# Patient Record
Sex: FEMALE | Race: WHITE | NOT HISPANIC OR LATINO | URBAN - METROPOLITAN AREA
[De-identification: names, ages, dates, MRNs, and addresses within clinical notes are randomized per-mention and may not be internally consistent; named-entity substitution may affect disease eponyms.]

---

## 2018-08-27 ENCOUNTER — APPOINTMENT (OUTPATIENT)
Dept: WOMENS IMAGING | Facility: HOSPITAL | Age: 53
End: 2018-08-27

## 2018-08-27 PROCEDURE — 77067 SCR MAMMO BI INCL CAD: CPT | Performed by: RADIOLOGY

## 2018-08-27 PROCEDURE — 77063 BREAST TOMOSYNTHESIS BI: CPT | Performed by: RADIOLOGY

## 2019-03-14 ENCOUNTER — OFFICE VISIT (OUTPATIENT)
Dept: CARDIOLOGY | Facility: CLINIC | Age: 54
End: 2019-03-14

## 2019-03-14 VITALS
BODY MASS INDEX: 36.88 KG/M2 | HEART RATE: 61 BPM | SYSTOLIC BLOOD PRESSURE: 136 MMHG | DIASTOLIC BLOOD PRESSURE: 88 MMHG | HEIGHT: 64 IN | WEIGHT: 216 LBS

## 2019-03-14 DIAGNOSIS — R07.89 CHEST PRESSURE: Primary | ICD-10-CM

## 2019-03-14 DIAGNOSIS — R06.02 SHORTNESS OF BREATH: ICD-10-CM

## 2019-03-14 DIAGNOSIS — I10 ESSENTIAL HYPERTENSION: ICD-10-CM

## 2019-03-14 DIAGNOSIS — E78.00 HYPERCHOLESTEREMIA: ICD-10-CM

## 2019-03-14 DIAGNOSIS — E88.81 METABOLIC SYNDROME: ICD-10-CM

## 2019-03-14 PROBLEM — E88.810 METABOLIC SYNDROME: Status: ACTIVE | Noted: 2019-03-14

## 2019-03-14 PROCEDURE — 93000 ELECTROCARDIOGRAM COMPLETE: CPT | Performed by: INTERNAL MEDICINE

## 2019-03-14 PROCEDURE — 99244 OFF/OP CNSLTJ NEW/EST MOD 40: CPT | Performed by: INTERNAL MEDICINE

## 2019-03-14 RX ORDER — ESTRADIOL 0.5 MG/1
0.5 TABLET ORAL DAILY
COMMUNITY

## 2019-03-14 RX ORDER — HYDROCODONE BITARTRATE AND ACETAMINOPHEN 5; 325 MG/1; MG/1
1 TABLET ORAL DAILY
COMMUNITY

## 2019-03-14 RX ORDER — NITROGLYCERIN 0.4 MG/1
TABLET SUBLINGUAL
Qty: 100 TABLET | Refills: 11 | Status: SHIPPED | OUTPATIENT
Start: 2019-03-14 | End: 2020-09-18

## 2019-03-14 RX ORDER — LISINOPRIL 40 MG/1
40 TABLET ORAL DAILY
COMMUNITY
End: 2019-09-17 | Stop reason: DRUGHIGH

## 2019-03-14 RX ORDER — ATORVASTATIN CALCIUM 40 MG/1
40 TABLET, FILM COATED ORAL DAILY
COMMUNITY

## 2019-03-14 RX ORDER — LEVOTHYROXINE SODIUM 88 UG/1
88 TABLET ORAL DAILY
COMMUNITY

## 2019-03-14 NOTE — PROGRESS NOTES
Chief Complaint   Patient presents with   • Establish Care     Referred for chest tightness. Reports that chest tightness started a few months ago, states that she has been having trouble catching her breath and has the tightness. Reports that she does have palpitations when she has the she has the tightness and SOA with a bad headache, with some jaw pain. Went to [facility] a few months ago and did stress test, will attempt to obtain. Will get labs from [facility]. PCP did an EKG and is in referral.   • Aspirin     Patient is not on aspirin.         CARDIAC COMPLAINTS  chest pressure/discomfort, dyspnea and palpitations      Subjective   Tasneem Emerson is a 53 y.o. female came in today for her initial cardiac evaluation.  She has history of hypertension, hypercholesterolemia, hypothyroidism who was admitted to the hospital in October of last year secondary to chest discomfort and shortness of breath.  She was kept in the hospital because of the chest pain.  MI was ruled out by serial EKG's and enzymes.  She then underwent a stress test and echocardiogram.  According to the report the EF by echocardiogram was normal.  She apparently underwent  stress test and according to the report, the left ventricular function was low at 37% but there was no evidence of focal ischemia.  She was discharged home on ACE inhibitors and statin.  She was feeling well for a few months but now started having chest tightness in the form of pressure-like feeling in the middle of the chest associated with shortness of breath.  She also has palpitation in the form of heart skipping and racing during that time.  When she was at her office she did have chest discomfort and her blood pressure was mildly elevated.  The dose of lisinopril was increased.  She feels little better.  In 2011, she was referred for a stress and echocardiogram.  At that time the echocardiogram showed normal EF with mild wall motion abnormality.  The stress test was done as a  regular stress and found to have a hypertensive blood pressure response and nonspecific ST-T changes.  She was not followed by any cardiologist at that time.  She used to be a smoker who quit in .  Hypertension, hypercholesterolemia and ischemic heart disease does run in the family.    Past Surgical History:   Procedure Laterality Date   • CARDIOVASCULAR STRESS TEST  10/25/2018    @ [facility]- Dr.Eberly- [place]- EF 37%. No Ischemia.   • CARDIOVASCULAR STRESS TEST  2011    R.Stress- 6 Min. 7.0 METS.88% THR. BP- 206/92. +CP. Upsloping ST changes.   • ECHO - CONVERTED  10/25/2018    @ [facility]. - EF 60%   • ECHO - CONVERTED  2011    EF 60%. Lateral WMA. Mild MR. RVSP- 29 mmHg       Current Outpatient Medications   Medication Sig Dispense Refill   • atorvastatin (LIPITOR) 40 MG tablet Take 40 mg by mouth Daily.     • estradiol (ESTRACE) 0.5 MG tablet Take 0.5 mg by mouth Daily.     • HYDROcodone-acetaminophen (NORCO) 5-325 MG per tablet Take 1 tablet by mouth Daily.     • levothyroxine (SYNTHROID, LEVOTHROID) 88 MCG tablet Take 88 mcg by mouth Daily.     • lisinopril (PRINIVIL,ZESTRIL) 40 MG tablet Take 40 mg by mouth Daily.     • aspirin 81 MG tablet Take 1 tablet by mouth Daily. 30 tablet 11   • nitroglycerin (NITROSTAT) 0.4 MG SL tablet 1 under the tongue as needed for angina, may repeat q5mins for up three doses 100 tablet 11     No current facility-administered medications for this visit.            ALLERGIES:  Patient has no known allergies.    Past Medical History:   Diagnosis Date   • History of  section     x2, 1612-3757   • History of tubal ligation    • Hx of hysterectomy    • Hyperlipidemia    • Hypertension    • Hypothyroidism    • Multinodular goiter    • Status post left partial knee replacement        Social History     Tobacco Use   Smoking Status Former Smoker   • Last attempt to quit: 3/14/2003   • Years since quittin.0   Smokeless Tobacco Never Used          Family  "History   Problem Relation Age of Onset   • Hyperlipidemia Mother    • Hyperthyroidism Mother    • Hypertension Mother    • Diabetes Father    • Heart failure Father    • Hyperlipidemia Father    • Hypertension Father    • Hypertension Sister    • Hyperlipidemia Brother    • Hypertension Brother    • Aneurysm Maternal Grandmother    • Heart attack Maternal Grandfather        Review of Systems   Constitution: Positive for malaise/fatigue. Negative for decreased appetite.   HENT: Negative for congestion and sore throat.    Eyes: Negative for blurred vision.   Cardiovascular: Positive for chest pain, dyspnea on exertion and palpitations.   Respiratory: Positive for shortness of breath. Negative for snoring.    Endocrine: Negative for cold intolerance and heat intolerance.   Hematologic/Lymphatic: Negative for adenopathy. Does not bruise/bleed easily.   Skin: Negative for itching, nail changes and skin cancer.   Musculoskeletal: Negative for arthritis and myalgias.   Gastrointestinal: Negative for abdominal pain, dysphagia and heartburn.   Genitourinary: Negative for bladder incontinence and frequency.   Neurological: Negative for dizziness, light-headedness, seizures and vertigo.   Psychiatric/Behavioral: Negative for altered mental status.   Allergic/Immunologic: Negative for environmental allergies and hives.       Diabetes- No  Thyroid- abnormal    Objective     /88 (BP Location: Left arm)   Pulse 61   Ht 162.6 cm (64\")   Wt 98 kg (216 lb)   BMI 37.08 kg/m²     Physical Exam   Constitutional: She is oriented to person, place, and time. She appears well-developed and well-nourished.   HENT:   Head: Normocephalic.   Nose: Nose normal.   Eyes: EOM are normal. Pupils are equal, round, and reactive to light.   Neck: Normal range of motion. Neck supple.   Cardiovascular: Normal rate, regular rhythm, S1 normal and S2 normal.   Murmur heard.  Pulmonary/Chest: Effort normal and breath sounds normal.   Abdominal: " Soft. Bowel sounds are normal.   Musculoskeletal: Normal range of motion. She exhibits no edema.   Neurological: She is alert and oriented to person, place, and time.   Skin: Skin is warm and dry.   Psychiatric: She has a normal mood and affect.         ECG 12 Lead  Date/Time: 3/14/2019 1:06 PM  Performed by: Tony Zayas MD  Authorized by: Tony Zayas MD   Comparison: compared with previous ECG from 2/25/2019  Similar to previous ECG  Rhythm: sinus rhythm  Rate: normal  Q waves: V1, V2, V3 and V4    QRS axis: normal    Clinical impression: abnormal EKG              Assessment/Plan   Patient's Body mass index is 37.08 kg/m². BMI is above normal parameters. Recommendations include: educational material, exercise counseling and nutrition counseling.     Tasneem was seen today for establish care and aspirin.    Diagnoses and all orders for this visit:    Chest pressure  -     [facility] Cath; Future  -     nitroglycerin (NITROSTAT) 0.4 MG SL tablet; 1 under the tongue as needed for angina, may repeat q5mins for up three doses    Essential hypertension    Hypercholesteremia    Shortness of breath    Metabolic syndrome    At baseline, her heart rate is stable blood pressure upper limit of normal.  Her EKG showed normal sinus rhythm, small QRS complex and non progression of RV across anterior leads.  Her clinical examination reveals BMI of 37.  Her cardiovascular examination is unremarkable.  I had a long talk with her about the stress finding.  I am concerned about the low EF by stress.  I explained to her that it could be secondary to processing after the stress.  The other possibility is balanced ischemia.  Since she continues to have the symptoms, I am more concerned about the balanced ischemia.  I explained to her, that the only way to be sure is to do the cardiac catheterization.  The risk and benefit was explained to her in detail.  She wanted to wait for at least 2 weeks before undergoing the  procedure.  Meanwhile, I did advise her to be on aspirin 81 mg once a day and also gave her a prescription for sublingual nitroglycerin to be used as needed.  Also had a long talk with her about the BMI.  I gave her papers on Mediterranean diet.  Based on the findings of the cardiac cath, further recommendations will be made.  She also has some features suggestive of sleep apnea.  She may need to undergo elective sleep study in the future.               Electronically signed by Tony Zayas MD March 14, 2019 12:56 PM

## 2019-03-14 NOTE — PATIENT INSTRUCTIONS
Mediterranean Diet  A Mediterranean diet refers to food and lifestyle choices that are based on the traditions of countries located on the [place]. This way of eating has been shown to help prevent certain conditions and improve outcomes for people who have chronic diseases, like kidney disease and heart disease.  What are tips for following this plan?  Lifestyle  · Cook and eat meals together with your family, when possible.  · Drink enough fluid to keep your urine clear or pale yellow.  · Be physically active every day. This includes:  ? Aerobic exercise like running or swimming.  ? Leisure activities like gardening, walking, or housework.  · Get 7-8 hours of sleep each night.  · If recommended by your health care provider, drink red wine in moderation. This means 1 glass a day for nonpregnant women and 2 glasses a day for men. A glass of wine equals 5 oz (150 mL).  Reading food labels  · Check the serving size of packaged foods. For foods such as rice and pasta, the serving size refers to the amount of cooked product, not dry.  · Check the total fat in packaged foods. Avoid foods that have saturated fat or trans fats.  · Check the ingredients list for added sugars, such as corn syrup.  Shopping  · At the grocery store, buy most of your food from the areas near the walls of the store. This includes:  ? Fresh fruits and vegetables (produce).  ? Grains, beans, nuts, and seeds. Some of these may be available in unpackaged forms or large amounts (in bulk).  ? Fresh seafood.  ? Poultry and eggs.  ? Low-fat dairy products.  · Buy whole ingredients instead of prepackaged foods.  · Buy fresh fruits and vegetables in-season from local farmers markets.  · Buy frozen fruits and vegetables in resealable bags.  · If you do not have access to quality fresh seafood, buy precooked frozen shrimp or canned fish, such as tuna, salmon, or sardines.  · Buy small amounts of raw or cooked vegetables, salads, or olives from the  deli or salad bar at your store.  · Stock your pantry so you always have certain foods on hand, such as olive oil, canned tuna, canned tomatoes, rice, pasta, and beans.  Cooking  · Cook foods with extra-virgin olive oil instead of using butter or other vegetable oils.  · Have meat as a side dish, and have vegetables or grains as your main dish. This means having meat in small portions or adding small amounts of meat to foods like pasta or stew.  · Use beans or vegetables instead of meat in common dishes like chili or lasagna.  · [place] with different cooking methods. Try roasting or broiling vegetables instead of steaming or sautéeing them.  · Add frozen vegetables to soups, stews, pasta, or rice.  · Add nuts or seeds for added healthy fat at each meal. You can add these to yogurt, salads, or vegetable dishes.  · Marinate fish or vegetables using olive oil, lemon juice, garlic, and fresh herbs.  Meal planning  · Plan to eat 1 vegetarian meal one day each week. Try to work up to 2 vegetarian meals, if possible.  · Eat seafood 2 or more times a week.  · Have healthy snacks readily available, such as:  ? Vegetable sticks with hummus.  ? Greek yogurt.  ? Fruit and nut trail mix.  · Eat balanced meals throughout the week. This includes:  ? Fruit: 2-3 servings a day  ? Vegetables: 4-5 servings a day  ? Low-fat dairy: 2 servings a day  ? Fish, poultry, or lean meat: 1 serving a day  ? Beans and legumes: 2 or more servings a week  ? Nuts and seeds: 1-2 servings a day  ? Whole grains: 6-8 servings a day  ? Extra-virgin olive oil: 3-4 servings a day  · Limit red meat and sweets to only a few servings a month  What are my food choices?  · Mediterranean diet  ? Recommended  ? Grains: Whole-grain pasta. Brown rice. Bulgar wheat. Polenta. Couscous. Whole-wheat bread. Oatmeal. Quinoa.  ? Vegetables: Artichokes. Beets. Broccoli. Cabbage. Carrots. Eggplant. Green beans. Chard. Kale. Spinach. Onions. Leeks. Peas. Squash.  Tomatoes. Peppers. Radishes.  ? Fruits: Apples. Apricots. Avocado. Berries. Bananas. Cherries. Dates. Figs. Grapes. Mitali. Melon. Oranges. Peaches. Plums. Pomegranate.  ? Meats and other protein foods: Beans. Almonds. Sunflower seeds. Pine nuts. Peanuts. Cod. [place]. Scallops. Shrimp. Tuna. Tilapia. Clams. Oysters. Eggs.  ? Dairy: Low-fat milk. Cheese. Greek yogurt.  ? Beverages: Water. Red wine. Herbal tea.  ? Fats and oils: Extra virgin olive oil. Avocado oil. Grape seed oil.  ? Sweets and desserts: Greek yogurt with honey. Baked apples. Poached pears. Trail mix.  ? Seasoning and other foods: Basil. Cilantro. Coriander. Cumin. Mint. Parsley. [place]. Rosemary. Tarragon. Garlic. Oregano. Thyme. Pepper. Balsalmic vinegar. Tahini. Hummus. Tomato sauce. Olives. Mushrooms.  ? Limit these  ? Grains: Prepackaged pasta or rice dishes. Prepackaged cereal with added sugar.  ? Vegetables: Deep fried potatoes (french fries).  ? Fruits: Fruit canned in syrup.  ? Meats and other protein foods: Beef. Pork. Lamb. Poultry with skin. Hot dogs. Muro.  ? Dairy: Ice cream. Sour cream. Whole milk.  ? Beverages: Juice. Sugar-sweetened soft drinks. Beer. Liquor and spirits.  ? Fats and oils: Butter. Canola oil. Vegetable oil. Beef fat (tallow). Lard.  ? Sweets and desserts: Cookies. Cakes. Pies. Candy.  ? Seasoning and other foods: Mayonnaise. Premade sauces and marinades.  ? The items listed may not be a complete list. Talk with your dietitian about what dietary choices are right for you.  Summary  · The Mediterranean diet includes both food and lifestyle choices.  · Eat a variety of fresh fruits and vegetables, beans, nuts, seeds, and whole grains.  · Limit the amount of red meat and sweets that you eat.  · Talk with your health care provider about whether it is safe for you to drink red wine in moderation. This means 1 glass a day for nonpregnant women and 2 glasses a day for men. A glass of wine equals 5 oz (150 mL).  This information  is not intended to replace advice given to you by your health care provider. Make sure you discuss any questions you have with your health care provider.  Document Released: 08/10/2017 Document Revised: 09/12/2017 Document Reviewed: 08/10/2017  [employer] Patient Education © 2019 [employer]

## 2019-03-28 ENCOUNTER — OUTSIDE FACILITY SERVICE (OUTPATIENT)
Dept: CARDIOLOGY | Facility: CLINIC | Age: 54
End: 2019-03-28

## 2019-03-28 ENCOUNTER — TELEPHONE (OUTPATIENT)
Dept: CARDIOLOGY | Facility: CLINIC | Age: 54
End: 2019-03-28

## 2019-03-28 DIAGNOSIS — R07.89 CHEST PRESSURE: ICD-10-CM

## 2019-03-28 PROCEDURE — 93458 L HRT ARTERY/VENTRICLE ANGIO: CPT | Performed by: INTERNAL MEDICINE

## 2019-03-28 NOTE — TELEPHONE ENCOUNTER
Patient called regarding a letter for her return to work.  She works in [employer] at [employer] and does heavy lifting, pushing.  She said you mentioned return Monday, but no heavy lifting for one week.  She said her position there is no option for light duty or restrictions.    Ok to type a letter to return to work in one week?

## 2019-09-17 RX ORDER — SPIRONOLACTONE 25 MG/1
TABLET ORAL
Qty: 30 TABLET | Refills: 0 | Status: SHIPPED | OUTPATIENT
Start: 2019-09-17

## 2019-09-17 RX ORDER — RANITIDINE 300 MG/1
TABLET ORAL
Qty: 90 TABLET | Refills: 1 | OUTPATIENT
Start: 2019-09-17

## 2020-09-16 DIAGNOSIS — R07.89 CHEST PRESSURE: ICD-10-CM

## 2020-09-18 RX ORDER — NITROGLYCERIN 0.4 MG/1
TABLET SUBLINGUAL
Qty: 25 TABLET | Refills: 0 | Status: SHIPPED | OUTPATIENT
Start: 2020-09-18

## 2024-07-03 ENCOUNTER — OFFICE VISIT (OUTPATIENT)
Dept: CARDIOLOGY | Facility: CLINIC | Age: 59
End: 2024-07-03
Payer: COMMERCIAL

## 2024-07-03 ENCOUNTER — PATIENT ROUNDING (BHMG ONLY) (OUTPATIENT)
Dept: CARDIOLOGY | Facility: CLINIC | Age: 59
End: 2024-07-03
Payer: COMMERCIAL

## 2024-07-03 VITALS
BODY MASS INDEX: 38.24 KG/M2 | SYSTOLIC BLOOD PRESSURE: 140 MMHG | HEART RATE: 68 BPM | HEIGHT: 64 IN | WEIGHT: 224 LBS | DIASTOLIC BLOOD PRESSURE: 70 MMHG

## 2024-07-03 DIAGNOSIS — E88.810 METABOLIC SYNDROME: ICD-10-CM

## 2024-07-03 DIAGNOSIS — F17.200 SMOKING: ICD-10-CM

## 2024-07-03 DIAGNOSIS — R53.82 CHRONIC FATIGUE: ICD-10-CM

## 2024-07-03 DIAGNOSIS — I10 ESSENTIAL HYPERTENSION: ICD-10-CM

## 2024-07-03 DIAGNOSIS — R06.02 SHORTNESS OF BREATH: ICD-10-CM

## 2024-07-03 DIAGNOSIS — E78.00 HYPERCHOLESTEREMIA: ICD-10-CM

## 2024-07-03 DIAGNOSIS — R07.89 CHEST PRESSURE: Primary | ICD-10-CM

## 2024-07-03 DIAGNOSIS — R60.0 BILATERAL LEG EDEMA: ICD-10-CM

## 2024-07-03 DIAGNOSIS — E03.9 HYPOTHYROIDISM, UNSPECIFIED TYPE: ICD-10-CM

## 2024-07-03 DIAGNOSIS — K20.90 ESOPHAGITIS: ICD-10-CM

## 2024-07-03 PROCEDURE — 93000 ELECTROCARDIOGRAM COMPLETE: CPT | Performed by: INTERNAL MEDICINE

## 2024-07-03 PROCEDURE — 99244 OFF/OP CNSLTJ NEW/EST MOD 40: CPT | Performed by: INTERNAL MEDICINE

## 2024-07-03 RX ORDER — ROSUVASTATIN CALCIUM 10 MG/1
10 TABLET, COATED ORAL DAILY
Qty: 90 TABLET | Refills: 3 | Status: SHIPPED | OUTPATIENT
Start: 2024-07-03

## 2024-07-03 RX ORDER — LISINOPRIL 40 MG/1
40 TABLET ORAL DAILY
COMMUNITY

## 2024-07-03 RX ORDER — HYDROCODONE BITARTRATE AND ACETAMINOPHEN 7.5; 325 MG/1; MG/1
1 TABLET ORAL EVERY 8 HOURS PRN
COMMUNITY

## 2024-07-03 RX ORDER — CITALOPRAM 20 MG/1
20 TABLET ORAL DAILY
COMMUNITY

## 2024-07-03 RX ORDER — METOPROLOL SUCCINATE 25 MG/1
25 TABLET, EXTENDED RELEASE ORAL DAILY
Qty: 90 TABLET | Refills: 3 | Status: SHIPPED | OUTPATIENT
Start: 2024-07-03

## 2024-07-03 RX ORDER — OMEPRAZOLE 40 MG/1
40 CAPSULE, DELAYED RELEASE ORAL DAILY
Qty: 90 CAPSULE | Refills: 3 | Status: SHIPPED | OUTPATIENT
Start: 2024-07-03

## 2024-07-03 RX ORDER — LEVOTHYROXINE SODIUM 0.1 MG/1
100 TABLET ORAL DAILY
COMMUNITY

## 2024-07-03 RX ORDER — ASPIRIN 81 MG/1
81 TABLET, CHEWABLE ORAL DAILY
Qty: 90 TABLET | Refills: 3 | Status: SHIPPED | OUTPATIENT
Start: 2024-07-03

## 2024-07-03 RX ORDER — PREGABALIN 300 MG/1
300 CAPSULE ORAL 2 TIMES DAILY
COMMUNITY

## 2024-07-03 NOTE — PROGRESS NOTES
July 3, 2024    Hello, may I speak with Tasneem Emerson?    My name is Antionette at Dr. Zayas's office.       I am  with [facility]  [address] [place] [id number] [phone].    Before we get started may I verify your date of birth? 1965    I am calling to officially welcome you to our practice and ask about your recent visit. Is this a good time to talk? yes    Tell me about your visit with us. What things went well?  Everyone from the very beginning to leaving was great.  Everyone had a great personality and I felt very welcome in the office.        We're always looking for ways to make our patients' experiences even better. Do you have recommendations on ways we may improve?  no    Overall were you satisfied with your first visit to our practice? yes       I appreciate you taking the time to speak with me today. Is there anything else I can do for you? no      Thank you, and have a great day.

## 2024-07-03 NOTE — LETTER
July 3, 2024       No Recipients    Patient: Tasneem Emerson   YOB: 1965   Date of Visit: 7/3/2024     Dear Galen Harden MD:       Thank you for referring Tasneem Emerson to me for evaluation. Below are the relevant portions of my assessment and plan of care.    If you have questions, please do not hesitate to call me. I look forward to following Tasneem along with you.         Sincerely,        Tony Zayas MD        CC:   No Recipients    Tony Zayas MD  07/03/24 1310  Sign when Signing Visit  Chief Complaint   Patient presents with   • Establish Care     PCP referred back, chest pain. Last seen here in 2019.    • Chest Pain     Severe episodes of sharp chest pain, randomly occurs, started about a 1 1/2 years ago, symptoms worsening, more frequent, about once a week now, goes across chest, radiates to neck and jaws. SOB noted. Feels like it's heartburn, takes med, relieves after 30 minutes.    • Shortness of Breath     With exertion, climbing stairs   • Leg Swelling     right worse, had lymph nodes removed. Now having bilateral. Pain at night and with walking.   • Melanoma     In right leg, extensive surgery with lymph nodes removed in 2019, s/p chemo.    • Cardiac history     No change in cardiac history since being here in 2019.         CARDIAC COMPLAINTS  chest pressure/discomfort, dyspnea, fatigue, and lower extremity edema      Subjective  Tasneem Emerson is a 58 y.o. female came in today for her initial cardiac evaluation.  She has history of hypertension, hypercholesterolemia, hypothyroidism and metabolic syndrome.  She was seen here in 2019 for chest pain, shortness of breath.  Her investigation was reported as having a low EF by nuclear images.  Cardiac catheterization showed EF around 55%.  It appears to be hypertensive heart disease.  Aldactone was added.  She was not getting any beta-blockers.  I have not seen her since then.  She was diagnosed with melanoma later that  year underwent extensive surgery with lymph node removed from her groin.  She also was treated with chemo.  She is now referred because of chest pain, shortness of breath, leg swelling.  The chest pain is a pressure-like feeling.  Multiple episodes of chest pain occurs.  It started about 1 and half years ago but now it is getting more frequent and longer lasting.  Some of the symptoms started as a heartburn and taking one of the GI medication that seems to be helpful.  She also has been noticing shortness of breath on minimal exertion.  She is noted bilateral leg swelling right more than the left.  She also has been to the emergency room at [facility] and lab work was normal.  Her recent lab work done at your office showed normal electrolytes.  Borderline elevation of alkaline phosphatase.  Cholesterol is 120 with the LDL of 56 and HDL of 39.  Her TSH was normal.  Her hemoglobin hematocrit upper limit of normal.  Her A1c was 6.1.  She used to be a smoker in the past.  She quit in 2003 and beginning of this year she started smoking again secondary to stress in her life.  Hypertension, hypercholesterolemia, hypothyroidism and diabetes to run in the family.    Past Surgical History:   Procedure Laterality Date   • CARDIAC CATHETERIZATION  03/28/2019    Normal Coronaries.EF 55%. Hypertensive heart disease.   • CARDIOVASCULAR STRESS TEST  10/25/2018    @ [facility]- Dr.Eberly- [place]- EF 37%. No Ischemia.   • CARDIOVASCULAR STRESS TEST  03/09/2011    R.Stress- 6 Min. 7.0 METS.88% THR. BP- 206/92. +CP. Upsloping ST changes.   • ECHO - CONVERTED  10/25/2018    @ [facility]. - EF 60%   • ECHO - CONVERTED  03/09/2011    EF 60%. Lateral WMA. Mild MR. RVSP- 29 mmHg       Current Outpatient Medications   Medication Sig Dispense Refill   • aspirin 81 MG chewable tablet Chew 1 tablet Daily. 90 tablet 3   • citalopram (CeleXA) 20 MG tablet Take 1 tablet by mouth Daily.     • HYDROcodone-acetaminophen (NORCO) 7.5-325 MG per tablet  Take 1 tablet by mouth Every 8 (Eight) Hours As Needed for Moderate Pain.     • levothyroxine (SYNTHROID, LEVOTHROID) 100 MCG tablet Take 1 tablet by mouth Daily.     • lisinopril (PRINIVIL,ZESTRIL) 40 MG tablet Take 1 tablet by mouth Daily.     • pregabalin (LYRICA) 300 MG capsule Take 1 capsule by mouth 2 (Two) Times a Day.     • estradiol (ESTRACE) 0.5 MG tablet Take 1 tablet by mouth Daily.     • HYDROcodone-acetaminophen (NORCO) 5-325 MG per tablet Take 1 tablet by mouth Daily.     • levothyroxine (SYNTHROID, LEVOTHROID) 88 MCG tablet Take 1 tablet by mouth Daily.     • metoprolol succinate XL (TOPROL-XL) 25 MG 24 hr tablet Take 1 tablet by mouth Daily. 90 tablet 3   • nitroglycerin (NITROSTAT) 0.4 MG SL tablet DISSOLVE 1 UNDER THE TONGUE AS NEEDED FOR ANGINA, MAY REPEAT EVERY 5MINS FOR UP THREE DOSES. Need appt for further refills. 25 tablet 0   • omeprazole (priLOSEC) 40 MG capsule Take 1 capsule by mouth Daily. 90 capsule 3   • rosuvastatin (CRESTOR) 10 MG tablet Take 1 tablet by mouth Daily. 90 tablet 3   • spironolactone (ALDACTONE) 25 MG tablet TAKE 1 TABLET BY MOUTH EVERY DAY 30 tablet 0     No current facility-administered medications for this visit.           ALLERGIES:  Patient has no known allergies.    Past Medical History:   Diagnosis Date   • History of  section     x2, [id number]   • History of tubal ligation    • Hx of hysterectomy    • Hyperlipidemia    • Hypertension    • Hypothyroidism    • Melanoma    • Multinodular goiter    • Status post left partial knee replacement        Social History     Tobacco Use   Smoking Status Every Day   • Current packs/day: 1.00   • Average packs/day: 1 pack/day for 9.5 years (9.5 ttl pk-yrs)   • Types: Cigarettes   • Start date:    Smokeless Tobacco Never          Family History   Problem Relation Age of Onset   • Hyperlipidemia Mother    • Hyperthyroidism Mother    • Hypertension Mother    • Diabetes Father    • Heart failure Father    •  "Hyperlipidemia Father    • Hypertension Father    • Hypertension Sister    • Hyperlipidemia Brother    • Hypertension Brother    • Aneurysm Maternal Grandmother    • Heart attack Maternal Grandfather        Review of Systems   Constitutional: Positive for malaise/fatigue. Negative for decreased appetite.   HENT:  Negative for congestion and sore throat.    Eyes:  Negative for blurred vision, double vision and visual disturbance.   Cardiovascular:  Positive for chest pain, dyspnea on exertion and leg swelling.   Respiratory:  Positive for shortness of breath. Negative for snoring.    Endocrine: Negative for cold intolerance and heat intolerance.   Hematologic/Lymphatic: Negative for adenopathy. Does not bruise/bleed easily.   Skin:  Negative for itching, nail changes and skin cancer.   Musculoskeletal:  Negative for arthritis and myalgias.   Gastrointestinal:  Negative for abdominal pain, dysphagia and heartburn.   Genitourinary:  Negative for bladder incontinence and frequency.   Neurological:  Negative for dizziness, seizures and vertigo.   Psychiatric/Behavioral:  Negative for altered mental status.    Allergic/Immunologic: Negative for environmental allergies and hives.     Diabetes- No  Thyroid- abnormal    Objective    /70 (BP Location: Right arm)   Pulse 68   Ht 162.6 cm (64\")   Wt 102 kg (224 lb)   BMI 38.45 kg/m²     Vitals and nursing note reviewed.   Constitutional:       Appearance: Healthy appearance. Not in distress.   Eyes:      Conjunctiva/sclera: Conjunctivae normal.      Pupils: Pupils are equal, round, and reactive to light.   HENT:      Head: Normocephalic.   Pulmonary:      Effort: Pulmonary effort is normal.      Breath sounds: Normal breath sounds.   Cardiovascular:      PMI at left midclavicular line. Normal rate. Regular rhythm.      Murmurs: There is a grade 3/6 high frequency blowing holosystolic murmur at the apex.   Edema:     Ankle: 1+ edema of the left ankle and 2+ edema of " the right ankle.     Feet: 1+ edema of the left foot and 2+ edema of the right foot.  Abdominal:      General: Bowel sounds are normal.      Palpations: Abdomen is soft.   Musculoskeletal: Normal range of motion.      Cervical back: Normal range of motion and neck supple. Skin:     General: Skin is warm and dry.   Neurological:      Mental Status: Alert, oriented to person, place, and time and oriented to person, place and time.       ECG 12 Lead    Date/Time: 7/3/2024 1:10 PM  Performed by: Tony Zayas MD    Authorized by: Tony Zayas MD  Comparison: compared with previous ECG from 3/14/2019  Similar to previous ECG  Rhythm: sinus rhythm  Rate: normal  Q waves: V1 and V2    QRS axis: normal  Other findings: left ventricular hypertrophy    Clinical impression: abnormal EKG        @ASSESSMENT/PLAN@  Class 2 Severe Obesity (BMI >=35 and <=39.9). Obesity-related health conditions include the following: hypertension, dyslipidemias, and lower extremity venous stasis disease. Obesity is newly identified. BMI is is above average; BMI management plan is completed. We discussed low calorie, low carb based diet program, portion control, and increasing exercise.     Diagnoses and all orders for this visit:    1. Chest pressure (Primary)  -     Stress Test With Myocardial Perfusion One Day; Future  -     CBC & Differential; Future  -     High Sensitivity CRP; Future  -     aspirin 81 MG chewable tablet; Chew 1 tablet Daily.  Dispense: 90 tablet; Refill: 3  -     metoprolol succinate XL (TOPROL-XL) 25 MG 24 hr tablet; Take 1 tablet by mouth Daily.  Dispense: 90 tablet; Refill: 3    2. Essential hypertension  -     Comprehensive Metabolic Panel; Future  -     aspirin 81 MG chewable tablet; Chew 1 tablet Daily.  Dispense: 90 tablet; Refill: 3  -     metoprolol succinate XL (TOPROL-XL) 25 MG 24 hr tablet; Take 1 tablet by mouth Daily.  Dispense: 90 tablet; Refill: 3    3. Hypercholesteremia  -     Stress Test With  Myocardial Perfusion One Day; Future  -     Lipid Panel; Future  -     aspirin 81 MG chewable tablet; Chew 1 tablet Daily.  Dispense: 90 tablet; Refill: 3  -     rosuvastatin (CRESTOR) 10 MG tablet; Take 1 tablet by mouth Daily.  Dispense: 90 tablet; Refill: 3    4. Shortness of breath  -     Adult Transthoracic Echo Complete W/ Cont if Necessary Per Protocol; Future  -     BNP; Future    5. Metabolic syndrome  -     Adult Transthoracic Echo Complete W/ Cont if Necessary Per Protocol; Future  -     Overnight Sleep Oximetry Study; Future    6. Smoking    7. Bilateral leg edema  -     BNP; Future  -     Venous w Reflux Lower Extremity - Bilateral CAR; Future    8. Chronic fatigue  -     Overnight Sleep Oximetry Study; Future    9. Esophagitis  -     omeprazole (priLOSEC) 40 MG capsule; Take 1 capsule by mouth Daily.  Dispense: 90 capsule; Refill: 3    10. Hypothyroidism, unspecified type  -     TSH; Future    At baseline her heart rate is stable.  Her blood pressure is mildly elevated.  Her EKG shows normal sinus rhythm, LVH, Q waves in the anteroseptal leads.  Her clinical examination reveals a BMI of 38.  She does have a short systolic murmur at the mitral area and 1-2+ pedal edema.    Regarding the chest discomfort, she does have some risk factor for CAD.  At [facility] they apparently did an upper GI and was told that she has gastritis and esophagitis.  At this time I started her on Prilosec 40 mg once a day.  I advised her to check a CRP level.  I also scheduled her to undergo a stress test to evaluate her functional status, chronotropic response, blood pressure response and to rule out any stress-induced ischemia or arrhythmia    Regarding her hypertension, it is still elevated in spite of taking lisinopril.  She also takes Aldactone and potassium is normal.  At this time I started her on a low-dose of beta-blockers.    Regarding her hypercholesterolemia, it seems to be very well-controlled with the Crestor.   Continue the same and talk to her about cutting down on the carbohydrate intake    Regarding her shortness of breath, it could be secondary to metabolic syndrome but other causes including sleep apnea need to be ruled out.  I advised her to check her nocturnal pulse pO2 measurement and also check the BNP level.  She also needs an echocardiogram to evaluate for LVH, LV function, valvular structures and the PA pressure    Regarding his smoking history, I had a long talk with her about it.  I explained to her the increased risk.  At this time she is not interested in quitting    Regarding her leg edema, it could be secondary to lymphatic drainage problem but need to rule out venous insufficiency.  I scheduled her to undergo venous reflux study.  I also advised her to check a BNP level    Regarding the chronic fatigue, it could be related to her metabolic syndrome.  Will check the nocturnal pulse pO2 measurement to rule out sleep apnea    Regarding her hypothyroidism, she does take thyroid supplements.  Need to recheck the levels    Overall cardiac status appears stable.  I will see her back in 6 months or sooner if needed               Electronically signed by Tony Zayas MD July 3, 2024 12:58 EDT

## 2024-07-03 NOTE — PROGRESS NOTES
Chief Complaint   Patient presents with   • Establish Care     PCP referred back, chest pain. Last seen here in 2019.    • Chest Pain     Severe episodes of sharp chest pain, randomly occurs, started about a 1 1/2 years ago, symptoms worsening, more frequent, about once a week now, goes across chest, radiates to neck and jaws. SOB noted. Feels like it's heartburn, takes med, relieves after 30 minutes.    • Shortness of Breath     With exertion, climbing stairs   • Leg Swelling     right worse, had lymph nodes removed. Now having bilateral. Pain at night and with walking.   • Melanoma     In right leg, extensive surgery with lymph nodes removed in 2019, s/p chemo.    • Cardiac history     No change in cardiac history since being here in 2019.         CARDIAC COMPLAINTS  chest pressure/discomfort, dyspnea, fatigue, and lower extremity edema      Subjective   Tasneem Emerson is a 58 y.o. female came in today for her initial cardiac evaluation.  She has history of hypertension, hypercholesterolemia, hypothyroidism and metabolic syndrome.  She was seen here in 2019 for chest pain, shortness of breath.  Her investigation was reported as having a low EF by nuclear images.  Cardiac catheterization showed EF around 55%.  It appears to be hypertensive heart disease.  Aldactone was added.  She was not getting any beta-blockers.  I have not seen her since then.  She was diagnosed with melanoma later that year underwent extensive surgery with lymph node removed from her groin.  She also was treated with chemo.  She is now referred because of chest pain, shortness of breath, leg swelling.  The chest pain is a pressure-like feeling.  Multiple episodes of chest pain occurs.  It started about 1 and half years ago but now it is getting more frequent and longer lasting.  Some of the symptoms started as a heartburn and taking one of the GI medication that seems to be helpful.  She also has been noticing shortness of breath on minimal  exertion.  She is noted bilateral leg swelling right more than the left.  She also has been to the emergency room at [facility] and lab work was normal.  Her recent lab work done at your office showed normal electrolytes.  Borderline elevation of alkaline phosphatase.  Cholesterol is 120 with the LDL of 56 and HDL of 39.  Her TSH was normal.  Her hemoglobin hematocrit upper limit of normal.  Her A1c was 6.1.  She used to be a smoker in the past.  She quit in 2003 and beginning of this year she started smoking again secondary to stress in her life.  Hypertension, hypercholesterolemia, hypothyroidism and diabetes to run in the family.    Past Surgical History:   Procedure Laterality Date   • CARDIAC CATHETERIZATION  03/28/2019    Normal Coronaries.EF 55%. Hypertensive heart disease.   • CARDIOVASCULAR STRESS TEST  10/25/2018    @ [facility]- Dr.Eberly- [place]- EF 37%. No Ischemia.   • CARDIOVASCULAR STRESS TEST  03/09/2011    R.Stress- 6 Min. 7.0 METS.88% THR. BP- 206/92. +CP. Upsloping ST changes.   • ECHO - CONVERTED  10/25/2018    @ [facility]. - EF 60%   • ECHO - CONVERTED  03/09/2011    EF 60%. Lateral WMA. Mild MR. RVSP- 29 mmHg       Current Outpatient Medications   Medication Sig Dispense Refill   • aspirin 81 MG chewable tablet Chew 1 tablet Daily. 90 tablet 3   • citalopram (CeleXA) 20 MG tablet Take 1 tablet by mouth Daily.     • HYDROcodone-acetaminophen (NORCO) 7.5-325 MG per tablet Take 1 tablet by mouth Every 8 (Eight) Hours As Needed for Moderate Pain.     • levothyroxine (SYNTHROID, LEVOTHROID) 100 MCG tablet Take 1 tablet by mouth Daily.     • lisinopril (PRINIVIL,ZESTRIL) 40 MG tablet Take 1 tablet by mouth Daily.     • pregabalin (LYRICA) 300 MG capsule Take 1 capsule by mouth 2 (Two) Times a Day.     • estradiol (ESTRACE) 0.5 MG tablet Take 1 tablet by mouth Daily.     • HYDROcodone-acetaminophen (NORCO) 5-325 MG per tablet Take 1 tablet by mouth Daily.     • levothyroxine (SYNTHROID, LEVOTHROID)  88 MCG tablet Take 1 tablet by mouth Daily.     • metoprolol succinate XL (TOPROL-XL) 25 MG 24 hr tablet Take 1 tablet by mouth Daily. 90 tablet 3   • nitroglycerin (NITROSTAT) 0.4 MG SL tablet DISSOLVE 1 UNDER THE TONGUE AS NEEDED FOR ANGINA, MAY REPEAT EVERY 5MINS FOR UP THREE DOSES. Need appt for further refills. 25 tablet 0   • omeprazole (priLOSEC) 40 MG capsule Take 1 capsule by mouth Daily. 90 capsule 3   • rosuvastatin (CRESTOR) 10 MG tablet Take 1 tablet by mouth Daily. 90 tablet 3   • spironolactone (ALDACTONE) 25 MG tablet TAKE 1 TABLET BY MOUTH EVERY DAY 30 tablet 0     No current facility-administered medications for this visit.           ALLERGIES:  Patient has no known allergies.    Past Medical History:   Diagnosis Date   • History of  section     x2, [id number]   • History of tubal ligation    • Hx of hysterectomy    • Hyperlipidemia    • Hypertension    • Hypothyroidism    • Melanoma    • Multinodular goiter    • Status post left partial knee replacement        Social History     Tobacco Use   Smoking Status Every Day   • Current packs/day: 1.00   • Average packs/day: 1 pack/day for 9.5 years (9.5 ttl pk-yrs)   • Types: Cigarettes   • Start date:    Smokeless Tobacco Never          Family History   Problem Relation Age of Onset   • Hyperlipidemia Mother    • Hyperthyroidism Mother    • Hypertension Mother    • Diabetes Father    • Heart failure Father    • Hyperlipidemia Father    • Hypertension Father    • Hypertension Sister    • Hyperlipidemia Brother    • Hypertension Brother    • Aneurysm Maternal Grandmother    • Heart attack Maternal Grandfather        Review of Systems   Constitutional: Positive for malaise/fatigue. Negative for decreased appetite.   HENT:  Negative for congestion and sore throat.    Eyes:  Negative for blurred vision, double vision and visual disturbance.   Cardiovascular:  Positive for chest pain, dyspnea on exertion and leg swelling.   Respiratory:   "Positive for shortness of breath. Negative for snoring.    Endocrine: Negative for cold intolerance and heat intolerance.   Hematologic/Lymphatic: Negative for adenopathy. Does not bruise/bleed easily.   Skin:  Negative for itching, nail changes and skin cancer.   Musculoskeletal:  Negative for arthritis and myalgias.   Gastrointestinal:  Negative for abdominal pain, dysphagia and heartburn.   Genitourinary:  Negative for bladder incontinence and frequency.   Neurological:  Negative for dizziness, seizures and vertigo.   Psychiatric/Behavioral:  Negative for altered mental status.    Allergic/Immunologic: Negative for environmental allergies and hives.     Diabetes- No  Thyroid- abnormal    Objective     /70 (BP Location: Right arm)   Pulse 68   Ht 162.6 cm (64\")   Wt 102 kg (224 lb)   BMI 38.45 kg/m²     Vitals and nursing note reviewed.   Constitutional:       Appearance: Healthy appearance. Not in distress.   Eyes:      Conjunctiva/sclera: Conjunctivae normal.      Pupils: Pupils are equal, round, and reactive to light.   HENT:      Head: Normocephalic.   Pulmonary:      Effort: Pulmonary effort is normal.      Breath sounds: Normal breath sounds.   Cardiovascular:      PMI at left midclavicular line. Normal rate. Regular rhythm.      Murmurs: There is a grade 3/6 high frequency blowing holosystolic murmur at the apex.   Edema:     Ankle: 1+ edema of the left ankle and 2+ edema of the right ankle.     Feet: 1+ edema of the left foot and 2+ edema of the right foot.  Abdominal:      General: Bowel sounds are normal.      Palpations: Abdomen is soft.   Musculoskeletal: Normal range of motion.      Cervical back: Normal range of motion and neck supple. Skin:     General: Skin is warm and dry.   Neurological:      Mental Status: Alert, oriented to person, place, and time and oriented to person, place and time.       ECG 12 Lead    Date/Time: 7/3/2024 1:10 PM  Performed by: Tony Zayas, " MD    Authorized by: Tony Zayas MD  Comparison: compared with previous ECG from 3/14/2019  Similar to previous ECG  Rhythm: sinus rhythm  Rate: normal  Q waves: V1 and V2    QRS axis: normal  Other findings: left ventricular hypertrophy    Clinical impression: abnormal EKG        @ASSESSMENT/PLAN@  Class 2 Severe Obesity (BMI >=35 and <=39.9). Obesity-related health conditions include the following: hypertension, dyslipidemias, and lower extremity venous stasis disease. Obesity is newly identified. BMI is is above average; BMI management plan is completed. We discussed low calorie, low carb based diet program, portion control, and increasing exercise.     Diagnoses and all orders for this visit:    1. Chest pressure (Primary)  -     Stress Test With Myocardial Perfusion One Day; Future  -     CBC & Differential; Future  -     High Sensitivity CRP; Future  -     aspirin 81 MG chewable tablet; Chew 1 tablet Daily.  Dispense: 90 tablet; Refill: 3  -     metoprolol succinate XL (TOPROL-XL) 25 MG 24 hr tablet; Take 1 tablet by mouth Daily.  Dispense: 90 tablet; Refill: 3    2. Essential hypertension  -     Comprehensive Metabolic Panel; Future  -     aspirin 81 MG chewable tablet; Chew 1 tablet Daily.  Dispense: 90 tablet; Refill: 3  -     metoprolol succinate XL (TOPROL-XL) 25 MG 24 hr tablet; Take 1 tablet by mouth Daily.  Dispense: 90 tablet; Refill: 3    3. Hypercholesteremia  -     Stress Test With Myocardial Perfusion One Day; Future  -     Lipid Panel; Future  -     aspirin 81 MG chewable tablet; Chew 1 tablet Daily.  Dispense: 90 tablet; Refill: 3  -     rosuvastatin (CRESTOR) 10 MG tablet; Take 1 tablet by mouth Daily.  Dispense: 90 tablet; Refill: 3    4. Shortness of breath  -     Adult Transthoracic Echo Complete W/ Cont if Necessary Per Protocol; Future  -     BNP; Future    5. Metabolic syndrome  -     Adult Transthoracic Echo Complete W/ Cont if Necessary Per Protocol; Future  -     Overnight  Sleep Oximetry Study; Future    6. Smoking    7. Bilateral leg edema  -     BNP; Future  -     Venous w Reflux Lower Extremity - Bilateral CAR; Future    8. Chronic fatigue  -     Overnight Sleep Oximetry Study; Future    9. Esophagitis  -     omeprazole (priLOSEC) 40 MG capsule; Take 1 capsule by mouth Daily.  Dispense: 90 capsule; Refill: 3    10. Hypothyroidism, unspecified type  -     TSH; Future    At baseline her heart rate is stable.  Her blood pressure is mildly elevated.  Her EKG shows normal sinus rhythm, LVH, Q waves in the anteroseptal leads.  Her clinical examination reveals a BMI of 38.  She does have a short systolic murmur at the mitral area and 1-2+ pedal edema.    Regarding the chest discomfort, she does have some risk factor for CAD.  At [facility] they apparently did an upper GI and was told that she has gastritis and esophagitis.  At this time I started her on Prilosec 40 mg once a day.  I advised her to check a CRP level.  I also scheduled her to undergo a stress test to evaluate her functional status, chronotropic response, blood pressure response and to rule out any stress-induced ischemia or arrhythmia    Regarding her hypertension, it is still elevated in spite of taking lisinopril.  She also takes Aldactone and potassium is normal.  At this time I started her on a low-dose of beta-blockers.    Regarding her hypercholesterolemia, it seems to be very well-controlled with the Crestor.  Continue the same and talk to her about cutting down on the carbohydrate intake    Regarding her shortness of breath, it could be secondary to metabolic syndrome but other causes including sleep apnea need to be ruled out.  I advised her to check her nocturnal pulse pO2 measurement and also check the BNP level.  She also needs an echocardiogram to evaluate for LVH, LV function, valvular structures and the PA pressure    Regarding his smoking history, I had a long talk with her about it.  I explained to her the  increased risk.  At this time she is not interested in quitting    Regarding her leg edema, it could be secondary to lymphatic drainage problem but need to rule out venous insufficiency.  I scheduled her to undergo venous reflux study.  I also advised her to check a BNP level    Regarding the chronic fatigue, it could be related to her metabolic syndrome.  Will check the nocturnal pulse pO2 measurement to rule out sleep apnea    Regarding her hypothyroidism, she does take thyroid supplements.  Need to recheck the levels    Overall cardiac status appears stable.  I will see her back in 6 months or sooner if needed               Electronically signed by Tony Zayas MD July 3, 2024 12:58 EDT

## 2024-07-30 ENCOUNTER — HOSPITAL ENCOUNTER (OUTPATIENT)
Dept: CARDIOLOGY | Facility: HOSPITAL | Age: 59
Discharge: HOME | End: 2024-07-30
Payer: COMMERCIAL

## 2024-07-30 ENCOUNTER — LAB (OUTPATIENT)
Dept: LAB | Facility: HOSPITAL | Age: 59
End: 2024-07-30
Payer: COMMERCIAL

## 2024-07-30 VITALS — BODY MASS INDEX: 38.39 KG/M2 | WEIGHT: 224.87 LBS | HEIGHT: 64 IN

## 2024-07-30 DIAGNOSIS — R06.02 SHORTNESS OF BREATH: ICD-10-CM

## 2024-07-30 DIAGNOSIS — R60.0 BILATERAL LEG EDEMA: ICD-10-CM

## 2024-07-30 DIAGNOSIS — R07.89 CHEST PRESSURE: ICD-10-CM

## 2024-07-30 DIAGNOSIS — E88.810 METABOLIC SYNDROME: ICD-10-CM

## 2024-07-30 DIAGNOSIS — E03.9 HYPOTHYROIDISM, UNSPECIFIED TYPE: ICD-10-CM

## 2024-07-30 DIAGNOSIS — I10 ESSENTIAL HYPERTENSION: ICD-10-CM

## 2024-07-30 DIAGNOSIS — E78.00 HYPERCHOLESTEREMIA: ICD-10-CM

## 2024-07-30 LAB
ALBUMIN SERPL-MCNC: 4.1 G/DL (ref 3.5–5.2)
ALBUMIN/GLOB SERPL: 1.5 G/DL
ALP SERPL-CCNC: 115 U/L (ref 39–117)
ALT SERPL W P-5'-P-CCNC: 14 U/L (ref 1–33)
ANION GAP SERPL CALCULATED.3IONS-SCNC: 12.6 MMOL/L (ref 5–15)
AORTIC DIMENSIONLESS INDEX: 0.72 (DI)
AST SERPL-CCNC: 19 U/L (ref 1–32)
BASOPHILS # BLD AUTO: 0.04 10*3/MM3 (ref 0–0.2)
BASOPHILS NFR BLD AUTO: 0.5 % (ref 0–1.5)
BH CV ECHO MEAS - AO MAX PG: 6.7 MMHG
BH CV ECHO MEAS - AO MEAN PG: 3.1 MMHG
BH CV ECHO MEAS - AO ROOT DIAM: 3 CM
BH CV ECHO MEAS - AO V2 MAX: 129.4 CM/SEC
BH CV ECHO MEAS - AO V2 VTI: 28.5 CM
BH CV ECHO MEAS - EDV(CUBED): 115.3 ML
BH CV ECHO MEAS - EF(MOD-BP): 54 %
BH CV ECHO MEAS - ESV(CUBED): 43.2 ML
BH CV ECHO MEAS - FS: 27.9 %
BH CV ECHO MEAS - IVS/LVPW: 0.94 CM
BH CV ECHO MEAS - IVSD: 1.09 CM
BH CV ECHO MEAS - LA DIMENSION: 3.7 CM
BH CV ECHO MEAS - LAT PEAK E' VEL: 7.5 CM/SEC
BH CV ECHO MEAS - LV MASS(C)D: 205.3 GRAMS
BH CV ECHO MEAS - LV MAX PG: 3 MMHG
BH CV ECHO MEAS - LV MEAN PG: 1.39 MMHG
BH CV ECHO MEAS - LV V1 MAX: 86 CM/SEC
BH CV ECHO MEAS - LV V1 VTI: 20.5 CM
BH CV ECHO MEAS - LVIDD: 4.9 CM
BH CV ECHO MEAS - LVIDS: 3.5 CM
BH CV ECHO MEAS - LVPWD: 1.16 CM
BH CV ECHO MEAS - MED PEAK E' VEL: 5.9 CM/SEC
BH CV ECHO MEAS - MV A MAX VEL: 59.2 CM/SEC
BH CV ECHO MEAS - MV DEC SLOPE: 408.7 CM/SEC2
BH CV ECHO MEAS - MV DEC TIME: 0.27 SEC
BH CV ECHO MEAS - MV E MAX VEL: 59.8 CM/SEC
BH CV ECHO MEAS - MV E/A: 1.01
BH CV ECHO MEAS - MV MAX PG: 3.1 MMHG
BH CV ECHO MEAS - MV MEAN PG: 1.41 MMHG
BH CV ECHO MEAS - MV P1/2T: 61.6 MSEC
BH CV ECHO MEAS - MV V2 VTI: 29.8 CM
BH CV ECHO MEAS - MVA(P1/2T): 3.6 CM2
BH CV ECHO MEAS - PA V2 MAX: 96.6 CM/SEC
BH CV ECHO MEAS - RV MAX PG: 1.9 MMHG
BH CV ECHO MEAS - RV V1 MAX: 68.9 CM/SEC
BH CV ECHO MEAS - RV V1 VTI: 18.7 CM
BH CV ECHO MEAS - RVDD: 3.3 CM
BH CV ECHO MEAS - TAPSE (>1.6): 3.6 CM
BH CV ECHO MEASUREMENTS AVERAGE E/E' RATIO: 8.93
BH CV REST NUCLEAR ISOTOPE DOSE: 10 MCI
BH CV STRESS COMMENTS STAGE 1: NORMAL
BH CV STRESS DOSE REGADENOSON STAGE 1: 0.4
BH CV STRESS DURATION MIN STAGE 1: 0
BH CV STRESS DURATION SEC STAGE 1: 10
BH CV STRESS NUCLEAR ISOTOPE DOSE: 30 MCI
BH CV STRESS PROTOCOL 1: NORMAL
BH CV STRESS RECOVERY BP: NORMAL MMHG
BH CV STRESS RECOVERY HR: 81 BPM
BH CV STRESS STAGE 1: 1
BH CV XLRA - TDI S': 15.3 CM/SEC
BILIRUB SERPL-MCNC: 0.4 MG/DL (ref 0–1.2)
BUN SERPL-MCNC: 6 MG/DL (ref 6–20)
BUN/CREAT SERPL: 7.9 (ref 7–25)
CALCIUM SPEC-SCNC: 9.1 MG/DL (ref 8.6–10.5)
CHLORIDE SERPL-SCNC: 103 MMOL/L (ref 98–107)
CHOLEST SERPL-MCNC: 124 MG/DL (ref 0–200)
CO2 SERPL-SCNC: 26.4 MMOL/L (ref 22–29)
CREAT SERPL-MCNC: 0.76 MG/DL (ref 0.57–1)
DEPRECATED RDW RBC AUTO: 46.4 FL (ref 37–54)
EGFRCR SERPLBLD CKD-EPI 2021: 91.5 ML/MIN/1.73
EOSINOPHIL # BLD AUTO: 0.15 10*3/MM3 (ref 0–0.4)
EOSINOPHIL NFR BLD AUTO: 1.7 % (ref 0.3–6.2)
ERYTHROCYTE [DISTWIDTH] IN BLOOD BY AUTOMATED COUNT: 13.8 % (ref 12.3–15.4)
GLOBULIN UR ELPH-MCNC: 2.8 GM/DL
GLUCOSE SERPL-MCNC: 99 MG/DL (ref 65–99)
HCT VFR BLD AUTO: 47.3 % (ref 34–46.6)
HDLC SERPL-MCNC: 41 MG/DL (ref 40–60)
HGB BLD-MCNC: 15.2 G/DL (ref 12–15.9)
IMM GRANULOCYTES # BLD AUTO: 0.03 10*3/MM3 (ref 0–0.05)
IMM GRANULOCYTES NFR BLD AUTO: 0.3 % (ref 0–0.5)
LDLC SERPL CALC-MCNC: 59 MG/DL (ref 0–100)
LDLC/HDLC SERPL: 1.37 {RATIO}
LV EF NUC BP: 65 %
LYMPHOCYTES # BLD AUTO: 1.48 10*3/MM3 (ref 0.7–3.1)
LYMPHOCYTES NFR BLD AUTO: 17.2 % (ref 19.6–45.3)
MAXIMAL PREDICTED HEART RATE: 163 BPM
MCH RBC QN AUTO: 29.3 PG (ref 26.6–33)
MCHC RBC AUTO-ENTMCNC: 32.1 G/DL (ref 31.5–35.7)
MCV RBC AUTO: 91.1 FL (ref 79–97)
MONOCYTES # BLD AUTO: 0.64 10*3/MM3 (ref 0.1–0.9)
MONOCYTES NFR BLD AUTO: 7.4 % (ref 5–12)
NEUTROPHILS NFR BLD AUTO: 6.26 10*3/MM3 (ref 1.7–7)
NEUTROPHILS NFR BLD AUTO: 72.9 % (ref 42.7–76)
NRBC BLD AUTO-RTO: 0 /100 WBC (ref 0–0.2)
NT-PROBNP SERPL-MCNC: 279.5 PG/ML (ref 0–900)
PERCENT MAX PREDICTED HR: 60.74 %
PLATELET # BLD AUTO: 299 10*3/MM3 (ref 140–450)
PMV BLD AUTO: 9.9 FL (ref 6–12)
POTASSIUM SERPL-SCNC: 3.9 MMOL/L (ref 3.5–5.2)
PROT SERPL-MCNC: 6.9 G/DL (ref 6–8.5)
RBC # BLD AUTO: 5.19 10*6/MM3 (ref 3.77–5.28)
SINUS: 3.1 CM
SODIUM SERPL-SCNC: 142 MMOL/L (ref 136–145)
STRESS BASELINE BP: NORMAL MMHG
STRESS BASELINE HR: 64 BPM
STRESS PERCENT HR: 71 %
STRESS POST PEAK BP: NORMAL MMHG
STRESS POST PEAK HR: 99 BPM
STRESS TARGET HR: 139 BPM
TRIGL SERPL-MCNC: 134 MG/DL (ref 0–150)
TSH SERPL DL<=0.05 MIU/L-ACNC: 2 UIU/ML (ref 0.27–4.2)
VLDLC SERPL-MCNC: 24 MG/DL (ref 5–40)
WBC NRBC COR # BLD AUTO: 8.6 10*3/MM3 (ref 3.4–10.8)

## 2024-07-30 PROCEDURE — 80053 COMPREHEN METABOLIC PANEL: CPT

## 2024-07-30 PROCEDURE — 93306 TTE W/DOPPLER COMPLETE: CPT | Performed by: INTERNAL MEDICINE

## 2024-07-30 PROCEDURE — 78452 HT MUSCLE IMAGE SPECT MULT: CPT | Performed by: INTERNAL MEDICINE

## 2024-07-30 PROCEDURE — 0 TECHNETIUM SESTAMIBI: Performed by: INTERNAL MEDICINE

## 2024-07-30 PROCEDURE — 93018 CV STRESS TEST I&R ONLY: CPT | Performed by: INTERNAL MEDICINE

## 2024-07-30 PROCEDURE — A9500 TC99M SESTAMIBI: HCPCS | Performed by: INTERNAL MEDICINE

## 2024-07-30 PROCEDURE — 86141 C-REACTIVE PROTEIN HS: CPT

## 2024-07-30 PROCEDURE — 85025 COMPLETE CBC W/AUTO DIFF WBC: CPT

## 2024-07-30 PROCEDURE — 25010000002 REGADENOSON 0.4 MG/5ML SOLUTION: Performed by: INTERNAL MEDICINE

## 2024-07-30 PROCEDURE — 36415 COLL VENOUS BLD VENIPUNCTURE: CPT

## 2024-07-30 PROCEDURE — 78452 HT MUSCLE IMAGE SPECT MULT: CPT

## 2024-07-30 PROCEDURE — 84443 ASSAY THYROID STIM HORMONE: CPT

## 2024-07-30 PROCEDURE — 80061 LIPID PANEL: CPT

## 2024-07-30 PROCEDURE — 93017 CV STRESS TEST TRACING ONLY: CPT

## 2024-07-30 PROCEDURE — 83880 ASSAY OF NATRIURETIC PEPTIDE: CPT

## 2024-07-30 PROCEDURE — 93306 TTE W/DOPPLER COMPLETE: CPT

## 2024-07-30 RX ORDER — REGADENOSON 0.08 MG/ML
0.4 INJECTION, SOLUTION INTRAVENOUS
Status: COMPLETED | OUTPATIENT
Start: 2024-07-30 | End: 2024-07-30

## 2024-07-30 RX ADMIN — TECHNETIUM TC 99M SESTAMIBI 1 DOSE: 1 INJECTION INTRAVENOUS at 10:05

## 2024-07-30 RX ADMIN — TECHNETIUM TC 99M SESTAMIBI 1 DOSE: 1 INJECTION INTRAVENOUS at 08:17

## 2024-07-30 RX ADMIN — REGADENOSON 0.4 MG: 0.08 INJECTION, SOLUTION INTRAVENOUS at 10:05

## 2024-07-31 ENCOUNTER — TELEPHONE (OUTPATIENT)
Dept: CARDIOLOGY | Facility: CLINIC | Age: 59
End: 2024-07-31
Payer: COMMERCIAL

## 2024-07-31 LAB — CRP SERPL-MCNC: 0.79 MG/DL (ref 0.01–0.5)

## 2024-07-31 RX ORDER — AMLODIPINE BESYLATE 5 MG/1
5 TABLET ORAL DAILY
Qty: 90 TABLET | Refills: 3 | Status: SHIPPED | OUTPATIENT
Start: 2024-07-31

## 2024-07-31 NOTE — TELEPHONE ENCOUNTER
----- Message from Tony Zayas sent at 7/30/2024  6:20 PM EDT -----  Norvasc 5  If more chest tightness, need cath

## 2024-07-31 NOTE — TELEPHONE ENCOUNTER
Patient aware of stress test results and recommendations to add Norvasc 5 mg daily and if chest tightness persist to call the office.  Patient is aware if symptoms persist may need cardiac cath for a definite diagnosis.

## 2025-01-07 DIAGNOSIS — E78.00 HYPERCHOLESTEREMIA: ICD-10-CM

## 2025-01-07 DIAGNOSIS — R07.89 CHEST PRESSURE: ICD-10-CM

## 2025-01-07 DIAGNOSIS — K20.90 ESOPHAGITIS: ICD-10-CM

## 2025-01-07 DIAGNOSIS — I10 ESSENTIAL HYPERTENSION: ICD-10-CM

## 2025-01-09 RX ORDER — OMEPRAZOLE 40 MG/1
40 CAPSULE, DELAYED RELEASE ORAL DAILY
Qty: 90 CAPSULE | Refills: 3 | Status: SHIPPED | OUTPATIENT
Start: 2025-01-09

## 2025-01-09 RX ORDER — METOPROLOL SUCCINATE 25 MG/1
25 TABLET, EXTENDED RELEASE ORAL DAILY
Qty: 30 TABLET | Refills: 0 | Status: SHIPPED | OUTPATIENT
Start: 2025-01-09

## 2025-01-09 RX ORDER — ROSUVASTATIN CALCIUM 10 MG/1
10 TABLET, COATED ORAL DAILY
Qty: 30 TABLET | Refills: 0 | Status: SHIPPED | OUTPATIENT
Start: 2025-01-09

## 2025-02-21 DIAGNOSIS — R07.89 CHEST PRESSURE: ICD-10-CM

## 2025-02-21 DIAGNOSIS — I10 ESSENTIAL HYPERTENSION: ICD-10-CM

## 2025-02-21 DIAGNOSIS — E78.00 HYPERCHOLESTEREMIA: ICD-10-CM

## 2025-02-24 RX ORDER — ROSUVASTATIN CALCIUM 10 MG/1
10 TABLET, COATED ORAL DAILY
Qty: 30 TABLET | Refills: 3 | Status: SHIPPED | OUTPATIENT
Start: 2025-02-24

## 2025-02-24 RX ORDER — METOPROLOL SUCCINATE 25 MG/1
25 TABLET, EXTENDED RELEASE ORAL DAILY
Qty: 30 TABLET | Refills: 3 | Status: SHIPPED | OUTPATIENT
Start: 2025-02-24